# Patient Record
Sex: MALE | NOT HISPANIC OR LATINO
[De-identification: names, ages, dates, MRNs, and addresses within clinical notes are randomized per-mention and may not be internally consistent; named-entity substitution may affect disease eponyms.]

---

## 2020-05-27 ENCOUNTER — APPOINTMENT (OUTPATIENT)
Dept: NEUROLOGY | Facility: CLINIC | Age: 65
End: 2020-05-27
Payer: COMMERCIAL

## 2020-05-27 DIAGNOSIS — H81.91 UNSPECIFIED DISORDER OF VESTIBULAR FUNCTION, RIGHT EAR: ICD-10-CM

## 2020-05-27 DIAGNOSIS — R51 HEADACHE: ICD-10-CM

## 2020-05-27 DIAGNOSIS — M54.2 CERVICALGIA: ICD-10-CM

## 2020-05-27 DIAGNOSIS — Z78.9 OTHER SPECIFIED HEALTH STATUS: ICD-10-CM

## 2020-05-27 DIAGNOSIS — R97.20 ELEVATED PROSTATE, SPECIFIC ANTIGEN [PSA]: ICD-10-CM

## 2020-05-27 DIAGNOSIS — Z82.41 FAMILY HISTORY OF SUDDEN CARDIAC DEATH: ICD-10-CM

## 2020-05-27 DIAGNOSIS — Z80.0 FAMILY HISTORY OF MALIGNANT NEOPLASM OF DIGESTIVE ORGANS: ICD-10-CM

## 2020-05-27 PROBLEM — Z00.00 ENCOUNTER FOR PREVENTIVE HEALTH EXAMINATION: Status: ACTIVE | Noted: 2020-05-27

## 2020-05-27 PROCEDURE — 99205 OFFICE O/P NEW HI 60 MIN: CPT | Mod: 95

## 2020-05-27 NOTE — REASON FOR VISIT
[Consultation] : a consultation visit [FreeTextEntry1] : Referred by Dr. Daniel Beckham for evaluation of head pressure, neck pain

## 2020-05-27 NOTE — REVIEW OF SYSTEMS
[Decr. Concentrating Ability] : decreased concentrating ability [Numbness] : numbness [Tingling] : tingling [Dizziness] : dizziness [As Noted in HPI] : as noted in HPI [Tension Headache] : tension-type headaches [As noted in HPI] : as noted in HPI [Negative] : Heme/Lymph [de-identified] : Tinnitus, flushed face [FreeTextEntry9] : Neck pain

## 2020-05-27 NOTE — HISTORY OF PRESENT ILLNESS
[Home] : at home, [unfilled] , at the time of the visit. [Medical Office: ([facility])___] : at the medical office located in  [Verbal consent obtained from patient] : the patient, [unfilled] [FreeTextEntry1] : 64-year-old left-handed male with no significant past medical history presents today with complaints of head pressure, neck pain, intermittent episodes of mental cloudiness/haziness, constant right tinnitus with ear pressure and dizziness.\par \par Patient reports that it all started in the end of March 2020, he was at work, all of a sudden he experienced severe neck pain followed by feeling of rush of blood to the chest and upper body, he thought he may  pass out, but did not lose consciousness, this was followed by tingling sensation in the arms and hands, symptoms lasted a few minutes, it dissipated,, he tried driving home could not drive, wife picked him up half way through, over the next few days he noted his head was heavy and congested, he also had slight cough. Over the ensuing week he was evaluated by his PMD, routine labs done were unremarkable, his symptoms improved but he continued to have had pressure /congestion, constant right tinnitus; he consulted an allergist, examination revealed slight right ear congestion with normal hearing, MRI of the brain/ MRA neck/carotids done was unremarkable. She has been treated with Flonase, and Claritin.\par \par Patient is back to his functioning self, he however continues to have constant neck pain, worse with flexion movements, in addition he has pressure/heaviness in the head, intermittent right ear pressure/tinnitus, occasional flushing or face and dizziness while driving. He denies paresthesias / spasms of lower extremities, denies gait imbalance, denies bladder or bowel dysfunction.

## 2020-05-27 NOTE — PHYSICAL EXAM
[General Appearance - Alert] : alert [General Appearance - In No Acute Distress] : in no acute distress [Oriented To Time, Place, And Person] : oriented to person, place, and time [Mood] : the mood was normal [Person] : oriented to person [Place] : oriented to place [Time] : oriented to time [Registration Intact] : recent registration memory intact [Concentration Intact] : normal concentrating ability [Naming Objects] : no difficulty naming common objects [Repeating Phrases] : no difficulty repeating a phrase [Fluency] : fluency intact [Comprehension] : comprehension intact [Current Events] : adequate knowledge of current events [Cranial Nerves Optic (II)] : visual acuity intact bilaterally,  visual fields full to confrontation, pupils equal round and reactive to light [Cranial Nerves Oculomotor (III)] : extraocular motion intact [Cranial Nerves Facial (VII)] : face symmetrical [Cranial Nerves Vestibulocochlear (VIII)] : hearing was intact bilaterally [Cranial Nerves Accessory (XI - Cranial And Spinal)] : head turning and shoulder shrug symmetric [Cranial Nerves Hypoglossal (XII)] : there was no tongue deviation with protrusion [Abnormal Walk] : normal gait [Balance] : balance was intact [FreeTextEntry6] : Limited virtual motor exam: No pronator drift, moves all 4 extremities antigravity, no finger to nose finger ataxia, no tremor. [PERRL With Normal Accommodation] : pupils were equal in size, round, reactive to light, with normal accommodation [Full Visual Field] : full visual field [FreeTextEntry1] : Neck ROM full

## 2020-05-27 NOTE — DISCUSSION/SUMMARY
[FreeTextEntry1] : 64-year-old male with no significant PMH presents today with c/omplaints of head pressure, neck pain, intermittent episodes of mental cloudiness/haziness, right ear pressure/tinnitus and dizziness, symptoms started in the end of March 2020, with acute epiosde of severe neck pain followed by feeling of rush of blood to upper body, near syncope, tingling sensation in arms/hands and feeling of heaviness - congestion and slight cough. \par \par # Possibility of peripheral vestibular > central dysfunction with Cephalgia\par \par # Rule out VBI\par \par # Cervicalgia ; rule out disc herniation\par \par # Husam out cardiac arrythmia / metabolic dysfunction  \par \par - Obtain MRA brain\par - Obtain MRI C-spine\par - May need vestibular therapy\par - F/U with cardio and f/u with me after that

## 2020-05-27 NOTE — DATA REVIEWED
[de-identified] : 5/21/20: MRI brain with and without contrast: Minimal small vessel ischemic change, moderate right mastoid opacification\par MRA neck: Normal MRA neck, no hemodynamically significant stenosis or occluded segment [de-identified] : LABS: B12 364, vitamin D 38.2, A1c 5.4, TSH 1.7, total cholesterol 204, HDL 73,

## 2020-06-12 ENCOUNTER — APPOINTMENT (OUTPATIENT)
Dept: MRI IMAGING | Facility: CLINIC | Age: 65
End: 2020-06-12

## 2020-06-12 DIAGNOSIS — M54.12 RADICULOPATHY, CERVICAL REGION: ICD-10-CM

## 2020-07-16 ENCOUNTER — APPOINTMENT (OUTPATIENT)
Dept: OTOLARYNGOLOGY | Facility: CLINIC | Age: 65
End: 2020-07-16

## 2020-07-21 ENCOUNTER — APPOINTMENT (OUTPATIENT)
Dept: OTOLARYNGOLOGY | Facility: CLINIC | Age: 65
End: 2020-07-21
Payer: COMMERCIAL

## 2020-07-21 VITALS
WEIGHT: 170 LBS | HEART RATE: 78 BPM | HEIGHT: 70 IN | BODY MASS INDEX: 24.34 KG/M2 | DIASTOLIC BLOOD PRESSURE: 82 MMHG | TEMPERATURE: 97.4 F | SYSTOLIC BLOOD PRESSURE: 144 MMHG

## 2020-07-21 DIAGNOSIS — F41.9 ANXIETY DISORDER, UNSPECIFIED: ICD-10-CM

## 2020-07-21 DIAGNOSIS — R42 DIZZINESS AND GIDDINESS: ICD-10-CM

## 2020-07-21 DIAGNOSIS — J32.0 CHRONIC MAXILLARY SINUSITIS: ICD-10-CM

## 2020-07-21 DIAGNOSIS — R43.0 ANOSMIA: ICD-10-CM

## 2020-07-21 PROCEDURE — 92557 COMPREHENSIVE HEARING TEST: CPT

## 2020-07-21 PROCEDURE — 31231 NASAL ENDOSCOPY DX: CPT

## 2020-07-21 PROCEDURE — 92567 TYMPANOMETRY: CPT

## 2020-07-21 PROCEDURE — 99204 OFFICE O/P NEW MOD 45 MIN: CPT | Mod: 25

## 2020-07-21 PROCEDURE — G0268 REMOVAL OF IMPACTED WAX MD: CPT

## 2020-07-21 NOTE — HISTORY OF PRESENT ILLNESS
[de-identified] : Hx of Covid 3 mos ago.  - Positive ab.  Had MRI done.  c/o head pressure and neck pain and occ blurred vision.  Also c/o decreased sense of smell.   No hospitalizations.  Patient has been seen by cardiologist and neurolgist.  Also followed by orthopedist.  Patient did see Dr. Zoila Sharma 5/19/2020 - treated with claritin d and flonase \par Here due to concerns about feeling of pressure in right ear.   Hears occ pulsating tinnitus and also high pitched ringing.

## 2020-07-21 NOTE — ASSESSMENT
[FreeTextEntry1] : Patient several mos post covid c/o pressure in head and intermittent ringing in especially right ear.  MRI and MRAs reviewed - no significant ENT finding but patient does have bilateral hfsnl.  Feel some of problem is related to hearing loss and may not be related to covid.  Also some issues are likely ET dysfunction with overlying anxiety.\par Discussed consideration  of hearing aides for tinnitus and also discussed neurotology eval for tinnitus in right ear ? steroid injection - patient will see Dr. Cowan.\par No evidence of sinusitis - conservative care for anosmia since this is recovering.  Continued on flonase and added azelastine spray.

## 2020-07-21 NOTE — REASON FOR VISIT
[Initial Consultation] : an initial consultation for [Tinnitus] : tinnitus [Spouse] : spouse [FreeTextEntry2] : ear pressure

## 2020-07-21 NOTE — PHYSICAL EXAM
[Nasal Endoscopy Performed] : nasal endoscopy was performed, see procedure section for findings [] : septum deviated to the right [Midline] : trachea located in midline position [Normal] : no rashes [de-identified] : cerumen right ear; left normal  [de-identified] : normal after cerumen removal

## 2020-07-21 NOTE — REVIEW OF SYSTEMS
[Dizziness] : dizziness [Vertigo] : vertigo [Ear Noises] : ear noises [Lightheadedness] : lightheadedness [Nasal Congestion] : nasal congestion [Problem Snoring] : problem snoring [Sinus Pain] : sinus pain [Sinus Pressure] : sinus pressure [Sense Of Smell Problem] : sense of smell problem [Throat Clearing] : throat clearing [Anxiety] : anxiety [Negative] : Endocrine [FreeTextEntry1] : headache

## 2020-07-21 NOTE — DATA REVIEWED
[de-identified] : audio today - bilat [facility].  [de-identified] : see reports - all normal  [de-identified] : MRI c spine- see report 6/2020; MRI brain - neg except minimal right mastoid opacification 15067;mra neck 5 /2020 - normal; mra head 5/2020 normal - unremarkable cerebral angio

## 2020-07-22 ENCOUNTER — APPOINTMENT (OUTPATIENT)
Dept: OTOLARYNGOLOGY | Facility: CLINIC | Age: 65
End: 2020-07-22
Payer: COMMERCIAL

## 2020-07-22 VITALS — WEIGHT: 170 LBS | TEMPERATURE: 98.1 F | BODY MASS INDEX: 24.34 KG/M2 | HEIGHT: 70 IN

## 2020-07-22 DIAGNOSIS — R29.818 OTHER SYMPTOMS AND SIGNS INVOLVING THE NERVOUS SYSTEM: ICD-10-CM

## 2020-07-22 DIAGNOSIS — Z86.19 PERSONAL HISTORY OF OTHER INFECTIOUS AND PARASITIC DISEASES: ICD-10-CM

## 2020-07-22 PROCEDURE — 99214 OFFICE O/P EST MOD 30 MIN: CPT | Mod: 25

## 2020-07-22 PROCEDURE — 69420 INCISION OF EARDRUM: CPT | Mod: RT

## 2020-07-23 NOTE — HISTORY OF PRESENT ILLNESS
[de-identified] : 64M late March, sensation of head congestion which improved, Has long-standing tinnitus.  Tinnitus pulsatile in nature. Has head pressure/ear pressure Right worse with leaning forward.  Head pressure moves around and is intermittent in nature.

## 2020-07-23 NOTE — DATA REVIEWED
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  Type A tymps b/l but with negative pressure on right [de-identified] : I personally reviewed MRI images and the report, which shows scattered opacification right mastoid cavity.\par

## 2020-08-12 ENCOUNTER — APPOINTMENT (OUTPATIENT)
Dept: OTOLARYNGOLOGY | Facility: CLINIC | Age: 65
End: 2020-08-12

## 2020-08-13 ENCOUNTER — TRANSCRIPTION ENCOUNTER (OUTPATIENT)
Age: 65
End: 2020-08-13

## 2020-08-19 ENCOUNTER — APPOINTMENT (OUTPATIENT)
Dept: OTOLARYNGOLOGY | Facility: CLINIC | Age: 65
End: 2020-08-19
Payer: COMMERCIAL

## 2020-08-19 PROCEDURE — 69433 CREATE EARDRUM OPENING: CPT

## 2020-08-19 PROCEDURE — 99214 OFFICE O/P EST MOD 30 MIN: CPT | Mod: 25

## 2020-08-19 NOTE — HISTORY OF PRESENT ILLNESS
[de-identified] : 64M late March, sensation of head congestion which improved, has long-standing tinnitus.  Tinnitus pulsatile in nature. Has head pressure/ear pressure Right worse with leaning forward.  Head pressure moves around and is intermittent in nature.  Myringotomy performed last visit,  Pt. felt better for two weeks and once the TM healed the pressure returned.

## 2020-08-19 NOTE — REASON FOR VISIT
[Subsequent Evaluation] : a subsequent evaluation for [Tinnitus] : tinnitus [FreeTextEntry2] : head pressure

## 2020-08-19 NOTE — PROCEDURE
[FreeTextEntry3] : Procedure note: Right myringotomy and tube insertion\par \par Aug 19, 2020 \par \par Description of Operative Procedure:  Risks, benefits, and alternatives of the planned procedure were discussed with the patient prior to proceeding.  Risks would include but are not limited to bleeding, infection, persistent drainage, persistent perforation, or failure to improve hearing.  Benefits would include improvement in hearing.  Topical anesthetic was used to anesthetize the tympanic membrane.  A myringotomy was made.  Serous fluid was suctioned.  A size 1.14 Paparella tube was placed.  The patient tolerated the procedure without complications.\par \par

## 2020-09-16 ENCOUNTER — APPOINTMENT (OUTPATIENT)
Dept: OTOLARYNGOLOGY | Facility: CLINIC | Age: 65
End: 2020-09-16
Payer: COMMERCIAL

## 2020-09-16 VITALS — WEIGHT: 170 LBS | BODY MASS INDEX: 24.34 KG/M2 | HEIGHT: 70 IN | TEMPERATURE: 97.2 F

## 2020-09-16 PROCEDURE — 69210 REMOVE IMPACTED EAR WAX UNI: CPT

## 2020-09-16 PROCEDURE — 99213 OFFICE O/P EST LOW 20 MIN: CPT | Mod: 25

## 2020-09-16 NOTE — PROCEDURE
[FreeTextEntry3] : Procedure note:  Bilateral cerumenectomy\par \par Sep 16, 2020 \par \par Description of Procedure:   Bilateral cerumen impactions were noted requiring debridement under the operating microscope using otologic instrumentation.  The patient tolerated the procedure without complications.\par \par

## 2020-09-16 NOTE — PHYSICAL EXAM
[de-identified] : patent dry right PE tube [Normal] : no rashes [FreeTextEntry2] : Facial nerve function is House Brackmann 1/6 in right ear and 1/6 in left ear.

## 2020-09-17 ENCOUNTER — TRANSCRIPTION ENCOUNTER (OUTPATIENT)
Age: 65
End: 2020-09-17

## 2020-11-25 ENCOUNTER — APPOINTMENT (OUTPATIENT)
Dept: OTOLARYNGOLOGY | Facility: CLINIC | Age: 65
End: 2020-11-25
Payer: COMMERCIAL

## 2020-11-25 VITALS
DIASTOLIC BLOOD PRESSURE: 86 MMHG | WEIGHT: 160 LBS | SYSTOLIC BLOOD PRESSURE: 136 MMHG | BODY MASS INDEX: 22.9 KG/M2 | HEART RATE: 83 BPM | HEIGHT: 70 IN | TEMPERATURE: 97.9 F

## 2020-11-25 DIAGNOSIS — H61.23 IMPACTED CERUMEN, BILATERAL: ICD-10-CM

## 2020-11-25 DIAGNOSIS — R42 DIZZINESS AND GIDDINESS: ICD-10-CM

## 2020-11-25 DIAGNOSIS — J31.0 CHRONIC RHINITIS: ICD-10-CM

## 2020-11-25 PROCEDURE — 92567 TYMPANOMETRY: CPT

## 2020-11-25 PROCEDURE — G0268 REMOVAL OF IMPACTED WAX MD: CPT

## 2020-11-25 PROCEDURE — 31231 NASAL ENDOSCOPY DX: CPT | Mod: 52

## 2020-11-25 PROCEDURE — 99213 OFFICE O/P EST LOW 20 MIN: CPT | Mod: 25

## 2020-11-26 NOTE — PROCEDURE
[FreeTextEntry3] : Procedure note:  Right cerumenectomy\par \par Nov 25, 2020 \par \par Description of Procedure:  Cerumen impaction was noted requiring debridement under the operating microscope using otologic instrumentation.  The patient tolerated the procedure without complications.\par  [FreeTextEntry6] : Procedure note: Nasal endoscopy\par \par Nov 25, 2020 \par \par Description of Procedure:  Nasal endoscopy was performed because of recalcitrant symptoms of nasal obstruction and/or chronic rhinitis, and anterior anatomic abnormalities precluding visualization.  Using a flexible endoscope with sheath, the nasal mucosa, septum, turbinates, and ostiomeatal complex were examined.  \par \par Nasal mucosa findings:  the nasal mucosa was mildly edematous.\par Septum findings:  the septum showed no abnormalities.\par Nasopharynx findings:  the patient could not tolerate examination of the nasopharynx.\par Middle meatus findings:  the middle meatus had no abnormalities.\par Sinuses findings:  the paranasal sinuses had no abnormalities.\par \par

## 2020-11-26 NOTE — HISTORY OF PRESENT ILLNESS
[de-identified] : still with fluctuating right ear pressure in association with headaches, seeing neurologist and receiving nerve blocks.  Also reports chronic nasal congestion.

## 2020-12-17 ENCOUNTER — OUTPATIENT (OUTPATIENT)
Dept: OUTPATIENT SERVICES | Facility: HOSPITAL | Age: 65
LOS: 1 days | Discharge: HOME | End: 2020-12-17
Payer: COMMERCIAL

## 2020-12-17 DIAGNOSIS — R71.8 OTHER ABNORMALITY OF RED BLOOD CELLS: ICD-10-CM

## 2020-12-18 ENCOUNTER — RESULT REVIEW (OUTPATIENT)
Age: 65
End: 2020-12-18

## 2020-12-18 ENCOUNTER — OUTPATIENT (OUTPATIENT)
Dept: OUTPATIENT SERVICES | Facility: HOSPITAL | Age: 65
LOS: 1 days | End: 2020-12-18

## 2020-12-18 ENCOUNTER — APPOINTMENT (OUTPATIENT)
Dept: HEMATOLOGY ONCOLOGY | Facility: CLINIC | Age: 65
End: 2020-12-18
Payer: COMMERCIAL

## 2020-12-18 VITALS
RESPIRATION RATE: 16 BRPM | HEART RATE: 73 BPM | BODY MASS INDEX: 24.05 KG/M2 | HEIGHT: 70 IN | WEIGHT: 168 LBS | TEMPERATURE: 97.4 F | SYSTOLIC BLOOD PRESSURE: 127 MMHG | DIASTOLIC BLOOD PRESSURE: 85 MMHG

## 2020-12-18 DIAGNOSIS — D47.2 MONOCLONAL GAMMOPATHY: ICD-10-CM

## 2020-12-18 DIAGNOSIS — D58.2 OTHER HEMOGLOBINOPATHIES: ICD-10-CM

## 2020-12-18 PROCEDURE — 84166 PROTEIN E-PHORESIS/URINE/CSF: CPT | Mod: 26

## 2020-12-18 PROCEDURE — XXXXX: CPT

## 2020-12-18 RX ORDER — AZELASTINE HYDROCHLORIDE 137 UG/1
137 SPRAY, METERED NASAL TWICE DAILY
Qty: 2 | Refills: 1 | Status: DISCONTINUED | COMMUNITY
Start: 2020-07-21 | End: 2020-12-18

## 2020-12-18 RX ORDER — OFLOXACIN OTIC 3 MG/ML
0.3 SOLUTION AURICULAR (OTIC) TWICE DAILY
Qty: 1 | Refills: 1 | Status: DISCONTINUED | COMMUNITY
Start: 2020-08-19 | End: 2020-12-18

## 2020-12-18 RX ORDER — HYDROCODONE BITARTRATE AND HOMATROPINE METHYLBROMIDE 5; 1.5 MG/5ML; MG/5ML
5-1.5 SYRUP ORAL
Qty: 120 | Refills: 0 | Status: DISCONTINUED | COMMUNITY
Start: 2020-03-25 | End: 2020-12-18

## 2020-12-18 RX ORDER — LORATADINE 5 MG/5 ML
SOLUTION, ORAL ORAL
Refills: 0 | Status: DISCONTINUED | COMMUNITY
End: 2020-12-18

## 2020-12-18 RX ORDER — ACETAMINOPHEN 500 MG
500 TABLET ORAL
Refills: 0 | Status: DISCONTINUED | COMMUNITY
End: 2020-12-18

## 2020-12-18 RX ORDER — CLONAZEPAM 0.5 MG/1
0.5 TABLET ORAL
Refills: 0 | Status: ACTIVE | COMMUNITY
Start: 2020-07-16

## 2020-12-18 RX ORDER — FLUTICASONE PROPIONATE 50 MCG
50 SPRAY, SUSPENSION NASAL
Refills: 0 | Status: DISCONTINUED | COMMUNITY
End: 2020-12-18

## 2020-12-18 RX ORDER — MECLIZINE HYDROCHLORIDE 12.5 MG/1
12.5 TABLET ORAL
Refills: 0 | Status: DISCONTINUED | COMMUNITY
End: 2020-12-18

## 2020-12-18 RX ORDER — AMITRIPTYLINE HYDROCHLORIDE 10 MG/1
10 TABLET, FILM COATED ORAL
Refills: 0 | Status: ACTIVE | COMMUNITY
Start: 2020-12-18

## 2021-01-08 NOTE — REASON FOR VISIT
[Initial Consultation] : an initial consultation for [FreeTextEntry2] : abnormal urine protein electrophoresis, r/o palsma cell dys

## 2021-03-19 ENCOUNTER — APPOINTMENT (OUTPATIENT)
Dept: OTOLARYNGOLOGY | Facility: CLINIC | Age: 66
End: 2021-03-19
Payer: COMMERCIAL

## 2021-03-19 DIAGNOSIS — H93.11 TINNITUS, RIGHT EAR: ICD-10-CM

## 2021-03-19 DIAGNOSIS — H69.81 OTHER SPECIFIED DISORDERS OF EUSTACHIAN TUBE, RIGHT EAR: ICD-10-CM

## 2021-03-19 DIAGNOSIS — H90.3 SENSORINEURAL HEARING LOSS, BILATERAL: ICD-10-CM

## 2021-03-19 DIAGNOSIS — H61.21 IMPACTED CERUMEN, RIGHT EAR: ICD-10-CM

## 2021-03-19 PROCEDURE — 99072 ADDL SUPL MATRL&STAF TM PHE: CPT

## 2021-03-19 PROCEDURE — 99213 OFFICE O/P EST LOW 20 MIN: CPT | Mod: 25

## 2021-03-19 PROCEDURE — G0268 REMOVAL OF IMPACTED WAX MD: CPT

## 2021-03-19 PROCEDURE — 92567 TYMPANOMETRY: CPT

## 2021-03-19 PROCEDURE — 92557 COMPREHENSIVE HEARING TEST: CPT

## 2021-03-19 RX ORDER — GABAPENTIN 100 MG/1
100 CAPSULE ORAL
Refills: 0 | Status: COMPLETED | COMMUNITY
Start: 2020-12-18 | End: 2021-03-19

## 2021-03-19 NOTE — PROCEDURE
[FreeTextEntry3] : Procedure note:  Removal of ear tube\par \par Mar 19, 2021 \par \par Removal of extruded ear tube  was performed under binocular microscopy using otologic instrumentation.\par \par Cerumen was removed under binocular microscopy with a combination of a suction and/or a loop curette. The patient tolerated the procedure well and there were no complications. The included findings were noted.\par \par \par

## 2021-03-19 NOTE — HISTORY OF PRESENT ILLNESS
[de-identified] : 65 year male follow up pressure in right ear. s/p Right myringotomy and tube insertion 08/19/2020. Reports still has pressure in right ear. States noticed ears "popping and close" then had bloody otorrhea in 02/2021 and pressure has recurred. Reports tenderness and tinnitus in right ear. Reports occasional dizziness. Denies otalgia, otorrhea, ear infections, hearing loss, headaches related to hearing.

## 2021-04-30 ENCOUNTER — APPOINTMENT (OUTPATIENT)
Dept: OTOLARYNGOLOGY | Facility: CLINIC | Age: 66
End: 2021-04-30

## 2021-10-14 ENCOUNTER — APPOINTMENT (OUTPATIENT)
Dept: ORTHOPEDIC SURGERY | Facility: CLINIC | Age: 66
End: 2021-10-14
Payer: COMMERCIAL

## 2021-10-14 ENCOUNTER — NON-APPOINTMENT (OUTPATIENT)
Age: 66
End: 2021-10-14

## 2021-10-14 PROCEDURE — 99204 OFFICE O/P NEW MOD 45 MIN: CPT | Mod: 25

## 2021-10-14 PROCEDURE — 73030 X-RAY EXAM OF SHOULDER: CPT | Mod: LT

## 2021-10-14 PROCEDURE — 20610 DRAIN/INJ JOINT/BURSA W/O US: CPT | Mod: LT

## 2021-10-14 NOTE — PHYSICAL EXAM
[de-identified] : Physical Exam:\par General: Well appearing, no acute distress\par Neurologic: A&Ox3, No focal deficits\par Head: NCAT without abrasions, lacerations, or ecchymosis to head, face, or scalp\par Eyes: No scleral icterus, no gross abnormalities\par Respiratory: Equal chest wall expansion bilaterally, no accessory muscle use\par Lymphatic: No lymphadenopathy palpated\par Skin: Warm and dry\par Psychiatric: Normal mood and affect\par \par C-Spine\par Palpation: Tenderness to paraspinal muscles and trapezius muscle\par ROM: side bending, symmetrical. Pain with extension and flexion of the neck.\par Reflexes: C5-7 [normal]\par \par Left Shoulder\par ·	Inspection/Palpation: Tenderness at supraspinatus insertion, no swelling or deformities\par ·	Range of Motion: no crepitus with ROM; Active FF 0-160 w/ hitching; ER at side 45; IR to back pocket with pain ; Passive FF 0-160; ER at side 45; IR to back pocket with pain\par ·	Strength: forward elevation in scapular plane [4/5], internal rotation [4/5], external rotation [4/5], adduction [4/5] and abduction [4/5]\par ·	Stability: no joint instability on provocative testing\par ·	Tests: Castelan test negative, Neer sign negative, POS drop arm test secondary to pain, bear hug test POS, Napolean sign negative, cross arm adduction POS, lift off sign positive, hornblowers sign negative, speeds test POS, Yergason's test POS, no bicipital groove tenderness, Waldrop's Active Compression test POS\par \par Right Shoulder\par ·	Inspection/Palpation: no tenderness, swelling or deformities\par ·	Range of Motion: full and painless in all planes, no crepitus\par ·	Strength: forward elevation in scapular plane 5/5, internal rotation 5/5, external rotation 5/5, adduction 5/5 and abduction 5/5\par ·	Stability: no joint instability on provocative testing\par ·	Tests: Castelan test negative, Neer sign negative, negative drop arm test secondary to pain, bear hug test negative, Napolean sign negative, cross arm adduction negative, lift off sign positive, hornblowers sign negative, speeds test negative, Yergason's test negative, no bicipital groove tenderness, Waldrop's Active Compression test negative [de-identified] : The following radiographs were ordered and read by me during this patients visit. I reviewed each radiograph in detail with the patient and discussed the findings as highlighted below. \par \par 4 views of the left shoulder show calcium deposits at RTC insertion, no fracture, dislocation or bony lesions. There is no evidence of degenerative change in the glenohumeral and acromioclavicular joints with maintenance of the joint space. Otherwise unremarkable.

## 2021-10-14 NOTE — PROCEDURE
[de-identified] : Injection: Left shoulder (Subacromial).\par Indication: Calcific Tendonitis\par \par A discussion was had with the patient regarding this procedure and all questions were answered. All risks, benefits and alternatives were discussed. These included but were not limited to bleeding, infection, and allergic reaction. Alcohol was used to clean the skin, and betadine was used to sterilize and prep the area in the posterior aspect of the left shoulder. Ethyl chloride spray was then used as a topical anesthetic. A 22-gauge needle was used to inject 3cc 1% xylocaine, 2cc 0.25% bupivacaine and 1cc of 40mg/mL triamcinolone acetonide into the left subacromial space. A sterile bandage was then applied. The patient tolerated the procedure well and there were no complications.\par

## 2021-10-14 NOTE — HISTORY OF PRESENT ILLNESS
[de-identified] : CARSON ESTRADA is a 66 year y/o male who presents for initial visit of Left shoulder pain. He reports constant dull achy pain since a year. He localizes most of his pain over anterior shoulder especially over biceps groove, and RTC insertion at greater tuberosity. He was performing weight training, but due to pain, he d/c it and notices minimal relief afterwards. He reports unable to weight for long period of time, and reports most pain with all shoulder movements like ER, IR and occasional FF. He also notices trapezius tightness mostly in late afternoon and evening. He denies numbness or tingling down the forearm. He occasionally take advil that provide temporary relief.

## 2021-10-14 NOTE — DISCUSSION/SUMMARY
[de-identified] : CARSON ESTRADA is a 66 year y/o male who presents for initial visit of Left shoulder pain secondary to calcific tendinitis. He reports constant dull achy pain since a year. He localizes most of his pain over anterior shoulder especially over biceps groove, and RTC insertion at greater tuberosity. He was performing weight training, but due to pain, he d/c it and notices minimal relief afterwards. He reports unable to weight for long period of time, and reports most pain with all shoulder movements like ER, IR and occasional FF. He also notices trapezius tightness mostly in late afternoon and evening. He denies numbness or tingling down the forearm. He occasionally take advil that provide temporary relief. \par \par The patient presents today with acute on chronic  onset of left shoulder pain consistent with a clinical diagnosis of calcific tendinopathy. Radiographs show a calcification at the rotator cuff insertion. Clinically, the patient specific point tenderness to this location as well as positive impingement signs. I discussed the pathophysiology of the diagnosis and the distinction between the resorptive and formative phase.  I discussed the treatment of calcific tendinitis with the patient at length today and the inflammatory process it incites during resorptive phase.\par \par I discussed the treatment of calcific tendinitis with the patient including nonoperative management as first line treatment. Anti-inflammatory medications (NSAID's) with physical therapy for strengthening and conditioning of the joint to preserve shoulder range of motion is typically required. Corticosteroid injection may be indicated for refractory cases and for acute symptomatic pain relief. If nonoperative management fails, arthroscopic debridement with possible rotator cuff repair if damaged during surgery may be required for recalcitrant cases.\par \par The patient elected to proceed with nonoperative management including a cortisone injection performed today that he tolerated well alongside starting formal PT in 1 week from today, 2x/week until we see them again. Conservative measures of treatment include rest until asymptomatic, activity avoidance, NSAID's PRN, application to ice to the area 2-3x daily for 20 minutes, with gradual return to activities. We will see them again for clinical reassessment in 6-8 weeks. He agrees with the above plan and all questions were answered.\par

## 2021-11-24 ENCOUNTER — TRANSCRIPTION ENCOUNTER (OUTPATIENT)
Age: 66
End: 2021-11-24

## 2022-04-04 ENCOUNTER — APPOINTMENT (OUTPATIENT)
Dept: ORTHOPEDIC SURGERY | Facility: CLINIC | Age: 67
End: 2022-04-04
Payer: COMMERCIAL

## 2022-04-04 VITALS
DIASTOLIC BLOOD PRESSURE: 90 MMHG | SYSTOLIC BLOOD PRESSURE: 141 MMHG | BODY MASS INDEX: 24.05 KG/M2 | WEIGHT: 168 LBS | HEART RATE: 76 BPM | HEIGHT: 70 IN

## 2022-04-04 PROCEDURE — 99214 OFFICE O/P EST MOD 30 MIN: CPT

## 2022-04-04 NOTE — DISCUSSION/SUMMARY
[de-identified] : CARSON ESTRADA is a 66 year y/o male who presents for f/u visit of Left shoulder calcific tendonitis. He had a Subacromial injection in October which he found helpful. He attended one PT session and stopped. His pain recurred a month or two ago. Localizes laterally to the left shoulder. He denies numbness or tingling down to extremity. \par \par We had a thorough discussion regarding the nature of his pain, the pathophysiology, as well as all treatment options. I had a lengthy discussion with the patient regarding their current condition. We discussed the treatment options including operative and nonoperative management.Considering the patient's current presentation of pain being worse than prior to corticosteroid injection and failing on greater than 3 months of conservative measures, an MRI is indicated at this time. A prescription for this was given to the patient. We will go over the MRI results with him upon obtaining the results in the office and advise him of further treatment options. We may consider repeat cortisone injection depending on the results. We also discussed arthroscopy and debridement/repair. All his questions were answered. \par

## 2022-04-04 NOTE — PHYSICAL EXAM
[de-identified] : Physical Exam:\par General: Well appearing, no acute distress\par Neurologic: A&Ox3, No focal deficits\par Head: NCAT without abrasions, lacerations, or ecchymosis to head, face, or scalp\par Eyes: No scleral icterus, no gross abnormalities\par Respiratory: Equal chest wall expansion bilaterally, no accessory muscle use\par Lymphatic: No lymphadenopathy palpated\par Skin: Warm and dry\par Psychiatric: Normal mood and affect\par \par C-Spine\par Palpation: Tenderness to paraspinal muscles and trapezius muscle\par ROM: side bending, symmetrical. Pain with extension and flexion of the neck.\par Reflexes: C5-7 [normal]\par \par Left Shoulder\par ·	Inspection/Palpation: Tenderness at supraspinatus insertion, no swelling or deformities\par ·	Range of Motion: no crepitus with ROM; Active FF 0-160 w/ hitching; ER at side 45; IR to back pocket with pain ; Passive FF 0-160; ER at side 45; IR to back pocket with pain\par ·	Strength: forward elevation in scapular plane [4+/5], internal rotation [4+/5], external rotation [4/5], adduction [4+/5] and abduction [4/5]\par ·	Stability: no joint instability on provocative testing\par ·	Tests: Castelan test negative, Neer sign negative, POS drop arm test secondary to pain, bear hug test POS, Napolean sign negative, cross arm adduction POS, lift off sign positive, hornblowers sign negative, speeds test POS, Yergason's test POS, bicipital groove tenderness, Waldrop's Active Compression test POS\par \par Right Shoulder\par ·	Inspection/Palpation: no tenderness, swelling or deformities\par ·	Range of Motion: full and painless in all planes, no crepitus\par ·	Strength: forward elevation in scapular plane 5/5, internal rotation 5/5, external rotation 5/5, adduction 5/5 and abduction 5/5\par ·	Stability: no joint instability on provocative testing\par ·	Tests: Castelan test negative, Neer sign negative, negative drop arm test secondary to pain, bear hug test negative, Napolean sign negative, cross arm adduction negative, lift off sign positive, hornblowers sign negative, speeds test negative, Yergason's test negative, no bicipital groove tenderness, Waldrop's Active Compression test negative

## 2022-04-04 NOTE — HISTORY OF PRESENT ILLNESS
[Worsening] : worsening [___ mths] : [unfilled] month(s) ago [2] : a current pain level of 2/10 [4] : an average pain level of 4/10 [Lifting] : worsened by lifting [Acetaminophen] : relieved by acetaminophen [Rest] : relieved by rest [de-identified] : CARSON ETSRADA is a 66 year y/o male who presents for f/u visit of Left shoulder calcific tendonitis. He had a Subacromial injection in October which he found helpful. He attended one PT session and stopped. His pain recurred a month or two ago. Localizes laterally to the left shoulder. He denies numbness or tingling down to extremity. \par

## 2022-04-04 NOTE — ADDENDUM
[FreeTextEntry1] : Documented by Nicholas Hodge acting as a scribe for Dr. Tomlinson on 04/04/2022. \par \par All medical record entries made by the Scribe were at my, Dr. Tomlinson's, direction and\par personally dictated by me on 04/04/2022. I have reviewed the chart and agree that the record\par accurately reflects my personal performance of the history, physical exam, procedure and imaging.

## 2022-04-14 ENCOUNTER — APPOINTMENT (OUTPATIENT)
Dept: ORTHOPEDIC SURGERY | Facility: CLINIC | Age: 67
End: 2022-04-14
Payer: COMMERCIAL

## 2022-04-14 DIAGNOSIS — M75.32 CALCIFIC TENDINITIS OF LEFT SHOULDER: ICD-10-CM

## 2022-04-14 PROCEDURE — 99442: CPT

## 2022-04-15 PROBLEM — M75.32 CALCIFIC TENDINITIS OF LEFT SHOULDER: Status: ACTIVE | Noted: 2021-10-14

## 2022-06-15 ENCOUNTER — NON-APPOINTMENT (OUTPATIENT)
Age: 67
End: 2022-06-15

## 2023-02-15 ENCOUNTER — APPOINTMENT (OUTPATIENT)
Dept: UROLOGY | Facility: CLINIC | Age: 68
End: 2023-02-15
Payer: MEDICARE

## 2023-02-15 VITALS
DIASTOLIC BLOOD PRESSURE: 86 MMHG | HEART RATE: 80 BPM | HEIGHT: 71 IN | SYSTOLIC BLOOD PRESSURE: 139 MMHG | BODY MASS INDEX: 24.5 KG/M2 | WEIGHT: 175 LBS

## 2023-02-15 PROCEDURE — 99204 OFFICE O/P NEW MOD 45 MIN: CPT

## 2023-02-15 PROCEDURE — 51798 US URINE CAPACITY MEASURE: CPT

## 2023-02-15 NOTE — PHYSICAL EXAM
[General Appearance - Well Developed] : well developed [General Appearance - Well Nourished] : well nourished [Normal Appearance] : normal appearance [Well Groomed] : well groomed [General Appearance - In No Acute Distress] : no acute distress [Edema] : no peripheral edema [Respiration, Rhythm And Depth] : normal respiratory rhythm and effort [Exaggerated Use Of Accessory Muscles For Inspiration] : no accessory muscle use [Abdomen Soft] : soft [Abdomen Tenderness] : non-tender [Costovertebral Angle Tenderness] : no ~M costovertebral angle tenderness [Urethral Meatus] : meatus normal [Penis Abnormality] : normal circumcised penis [Urinary Bladder Findings] : the bladder was normal on palpation [Scrotum] : the scrotum was normal [Testes Tenderness] : no tenderness of the testes [Testes Mass (___cm)] : there were no testicular masses [Anus Abnormality] : the anus and perineum were normal [Rectal Exam - Rectum] : digital rectal exam was normal [Prostate Tenderness] : the prostate was not tender [No Prostate Nodules] : no prostate nodules [Prostate Size ___ (0-4)] : prostate size [unfilled] (scale: 0-4) [Normal Station and Gait] : the gait and station were normal for the patient's age [] : no rash [No Focal Deficits] : no focal deficits [Oriented To Time, Place, And Person] : oriented to person, place, and time [Affect] : the affect was normal [Mood] : the mood was normal [Not Anxious] : not anxious [No Palpable Adenopathy] : no palpable adenopathy

## 2023-02-15 NOTE — HISTORY OF PRESENT ILLNESS
[FreeTextEntry1] : 67 year old man seen 02/15/2023 For routine gu exam. He has history of elevated PSA. Negative biopsy and negative MRI in 2017 with Dr Tejada. 36 g prostate at that time. PSA was 5-6. 6.0 (02/2023) again most recently.  He does reports nocturia 1-3x/night. He has had this for 20 years. He reports trying different medications for this with Dr Tejada, but stopped due to side effects. He is not currently bothered by this.\par No hematuria, no dysuria, no frequency, no urgency, no hesitancy, no straining. No incontinence. \par No fevers, no chills, no nausea, no vomiting, no flank pain. \par PVR 5 mL.

## 2023-02-15 NOTE — ASSESSMENT
[FreeTextEntry1] : 68 yo M with BPH with LUTS, elevated PSA, nocturia. \par For nocturia, we discussed that nocturia can be the result of nocturnal polyuria, global polyuria, or diminished bladder capacity either globally or nocturnally. The underlying cause of nocturia can be difficult to discern but include conditions that cause lower extremity edema such as CHF, venous insufficiency. Other causes include CHF, Diabetes mellitus, diabetes insipidus, and others. If any of these conditions are present, they should be addressed before urologic treatments are tried. Nocturia can be caused or exacerbated by BPH or OAB. If these are present, they can be treated. OAB and BPH treatments have been shown to only suboptimally treat nocturia, but should be attempted. We discussed that the best initial tool in the work up for nocturia is a voiding diary. Depending on the results of the diary, treatment options include medications for OAB such as anticholinergics or beta agonists, medications for BPH, behavioral modifications such as stopping fluid intake after 6 PM, or desmopressin to reduce nocturnal polyuria. He chooses to observe for now. He will research b3-agonists as possible treatment and will consider FVC in future if sx become more bothersome. \par \par For elevated PSA, his previous biopsy and MRI were negative and PSA has been stable since. Discussed repeating these but pt chooses to trend PSA for now. RTO in 1 year for repeat PSA.\par \par For BPH, his sx are not bothersome and PVR is minimal so will observe.

## 2023-02-21 LAB
APPEARANCE: CLEAR
BACTERIA UR CULT: NORMAL
BACTERIA: NEGATIVE
BILIRUBIN URINE: NEGATIVE
BLOOD URINE: NEGATIVE
COLOR: NORMAL
GLUCOSE QUALITATIVE U: NEGATIVE
HYALINE CASTS: 0 /LPF
KETONES URINE: NEGATIVE
LEUKOCYTE ESTERASE URINE: NEGATIVE
MICROSCOPIC-UA: NORMAL
NITRITE URINE: NEGATIVE
PH URINE: 6.5
PROTEIN URINE: NORMAL
RED BLOOD CELLS URINE: 0 /HPF
SPECIFIC GRAVITY URINE: 1.02
SQUAMOUS EPITHELIAL CELLS: 0 /HPF
UROBILINOGEN URINE: NORMAL
WHITE BLOOD CELLS URINE: 0 /HPF

## 2023-03-04 NOTE — ADDENDUM
[FreeTextEntry1] : Documented by Nicholas Hodge acting as a scribe for Dr. Tomlinson on 10/14/2021. \par \par All medical record entries made by the Scribe were at my, Dr. Tomlinson's, direction and\par personally dictated by me on 10/14/2021. I have reviewed the chart and agree that the record\par accurately reflects my personal performance of the history, physical exam, procedure and imaging. 
This document is complete and the patient is ready for discharge.

## 2024-02-21 ENCOUNTER — NON-APPOINTMENT (OUTPATIENT)
Age: 69
End: 2024-02-21

## 2024-02-26 ENCOUNTER — APPOINTMENT (OUTPATIENT)
Dept: UROLOGY | Facility: CLINIC | Age: 69
End: 2024-02-26
Payer: MEDICARE

## 2024-02-26 DIAGNOSIS — R97.20 ELEVATED PROSTATE, SPECIFIC ANTIGEN [PSA]: ICD-10-CM

## 2024-02-26 DIAGNOSIS — R35.1 NOCTURIA: ICD-10-CM

## 2024-02-26 DIAGNOSIS — N13.8 BENIGN PROSTATIC HYPERPLASIA WITH LOWER URINARY TRACT SYMPMS: ICD-10-CM

## 2024-02-26 DIAGNOSIS — N40.1 BENIGN PROSTATIC HYPERPLASIA WITH LOWER URINARY TRACT SYMPMS: ICD-10-CM

## 2024-02-26 PROCEDURE — 99213 OFFICE O/P EST LOW 20 MIN: CPT

## 2024-02-26 NOTE — HISTORY OF PRESENT ILLNESS
[FreeTextEntry1] : 67 year old man seen 02/15/2023 For routine gu exam. He has history of elevated PSA. Negative biopsy and negative MRI in 2017 with Dr Tejada. 36 g prostate at that time. PSA was 5-6. 6.0 (02/2023) again most recently.  He does reports nocturia 1-3x/night. He has had this for 20 years. He reports trying different medications for this with Dr Tejada, but stopped due to side effects. He is not currently bothered by this. No hematuria, no dysuria, no frequency, no urgency, no hesitancy, no straining. No incontinence.  No fevers, no chills, no nausea, no vomiting, no flank pain.  PVR 5 mL.  02/26/2024: Patient presents for follow up. He reports nocturia 1-2x/night. not bothresome. He has not tried BPH of OAB medications. PSA 6.2. No daytime LUTS or other  complaints.

## 2024-02-26 NOTE — PHYSICAL EXAM
[General Appearance - Well Developed] : well developed [General Appearance - Well Nourished] : well nourished [Normal Appearance] : normal appearance [Well Groomed] : well groomed [General Appearance - In No Acute Distress] : no acute distress [Edema] : no peripheral edema [Respiration, Rhythm And Depth] : normal respiratory rhythm and effort [Exaggerated Use Of Accessory Muscles For Inspiration] : no accessory muscle use [Abdomen Soft] : soft [Abdomen Tenderness] : non-tender [Urethral Meatus] : meatus normal [Costovertebral Angle Tenderness] : no ~M costovertebral angle tenderness [Urinary Bladder Findings] : the bladder was normal on palpation [Penis Abnormality] : normal circumcised penis [Testes Tenderness] : no tenderness of the testes [Scrotum] : the scrotum was normal [Testes Mass (___cm)] : there were no testicular masses [Anus Abnormality] : the anus and perineum were normal [Prostate Tenderness] : the prostate was not tender [Rectal Exam - Rectum] : digital rectal exam was normal [No Prostate Nodules] : no prostate nodules [Prostate Size ___ (0-4)] : prostate size [unfilled] (scale: 0-4) [Normal Station and Gait] : the gait and station were normal for the patient's age [] : no rash [No Focal Deficits] : no focal deficits [Oriented To Time, Place, And Person] : oriented to person, place, and time [Affect] : the affect was normal [Mood] : the mood was normal [No Palpable Adenopathy] : no palpable adenopathy [Not Anxious] : not anxious

## 2024-02-26 NOTE — ASSESSMENT
[FreeTextEntry1] : 67 yo M with BPH with LUTS, elevated PSA, nocturia.  For nocturia, we discussed that nocturia can be the result of nocturnal polyuria, global polyuria, or diminished bladder capacity either globally or nocturnally. The underlying cause of nocturia can be difficult to discern but include conditions that cause lower extremity edema such as CHF, venous insufficiency. Other causes include CHF, Diabetes mellitus, diabetes insipidus, and others. If any of these conditions are present, they should be addressed before urologic treatments are tried. Nocturia can be caused or exacerbated by BPH or OAB. If these are present, they can be treated. OAB and BPH treatments have been shown to only suboptimally treat nocturia, but should be attempted. We discussed that the best initial tool in the work up for nocturia is a voiding diary. Depending on the results of the diary, treatment options include medications for OAB such as anticholinergics or beta agonists, medications for BPH, behavioral modifications such as stopping fluid intake after 6 PM, or desmopressin to reduce nocturnal polyuria. He chooses to observe for now. He will research b3-agonists as possible treatment and will consider FVC in future if sx become more bothersome.   For elevated PSA, his previous biopsy and MRI were negative and PSA has been stable since. Discussed repeating these but pt chooses to trend PSA for now. RTO in 1 year for repeat PSA.  For BPH, his sx are not bothersome and PVR is minimal so will observe.

## 2024-02-26 NOTE — REASON FOR VISIT
[Initial Visit ___] : [unfilled] is here today for an initial visit  for [unfilled] [Follow-up Visit ___] : a follow-up visit  for [unfilled] 75 feet

## 2024-03-29 NOTE — HISTORY OF PRESENT ILLNESS
[de-identified] : reports significant improvement in ear pressure and tinnitus since tube placement, no pain or drainage. - - -

## 2025-02-10 ENCOUNTER — APPOINTMENT (OUTPATIENT)
Dept: UROLOGY | Facility: CLINIC | Age: 70
End: 2025-02-10
Payer: MEDICARE

## 2025-02-10 DIAGNOSIS — N13.8 BENIGN PROSTATIC HYPERPLASIA WITH LOWER URINARY TRACT SYMPMS: ICD-10-CM

## 2025-02-10 DIAGNOSIS — R97.20 ELEVATED PROSTATE, SPECIFIC ANTIGEN [PSA]: ICD-10-CM

## 2025-02-10 DIAGNOSIS — R35.1 NOCTURIA: ICD-10-CM

## 2025-02-10 DIAGNOSIS — N40.1 BENIGN PROSTATIC HYPERPLASIA WITH LOWER URINARY TRACT SYMPMS: ICD-10-CM

## 2025-02-10 PROCEDURE — 99213 OFFICE O/P EST LOW 20 MIN: CPT
